# Patient Record
Sex: MALE | URBAN - METROPOLITAN AREA
[De-identification: names, ages, dates, MRNs, and addresses within clinical notes are randomized per-mention and may not be internally consistent; named-entity substitution may affect disease eponyms.]

---

## 2023-10-11 ENCOUNTER — OFFICE VISIT (OUTPATIENT)
Dept: URBAN - METROPOLITAN AREA CLINIC 72 | Facility: CLINIC | Age: 78
End: 2023-10-11
Payer: COMMERCIAL

## 2023-10-11 ENCOUNTER — DASHBOARD ENCOUNTERS (OUTPATIENT)
Age: 78
End: 2023-10-11

## 2023-10-11 VITALS
SYSTOLIC BLOOD PRESSURE: 114 MMHG | BODY MASS INDEX: 27.41 KG/M2 | WEIGHT: 202.4 LBS | TEMPERATURE: 97.3 F | HEART RATE: 64 BPM | DIASTOLIC BLOOD PRESSURE: 69 MMHG | HEIGHT: 72 IN

## 2023-10-11 DIAGNOSIS — R10.84 GENERALIZED ABDOMINAL PAIN: ICD-10-CM

## 2023-10-11 PROCEDURE — 99203 OFFICE O/P NEW LOW 30 MIN: CPT | Performed by: INTERNAL MEDICINE

## 2023-10-11 RX ORDER — ALFUZOSIN HYDROCHLORIDE 10 MG/1
TAKE ONE TABLET BY MOUTH ONE TIME DAILY AS DIRECTED TABLET, FILM COATED, EXTENDED RELEASE ORAL
Qty: 90 UNSPECIFIED | Refills: 0 | Status: ACTIVE | COMMUNITY

## 2023-10-11 RX ORDER — DUTASTERIDE 0.5 MG/1
TAKE ONE CAPSULE BY MOUTH ONE TIME DAILY CAPSULE, LIQUID FILLED ORAL
Qty: 90 UNSPECIFIED | Refills: 0 | Status: ACTIVE | COMMUNITY

## 2023-10-11 RX ORDER — METOPROLOL SUCCINATE 25 MG/1
TAKE ONE TABLET BY MOUTH AT BEDTIME TABLET, EXTENDED RELEASE ORAL
Qty: 90 UNSPECIFIED | Refills: 1 | Status: ACTIVE | COMMUNITY

## 2023-10-11 RX ORDER — PREDNISONE 5 MG/1
TABLET ORAL
Qty: 90 TABLET | Status: ACTIVE | COMMUNITY

## 2023-10-11 RX ORDER — SULFAMETHOXAZOLE AND TRIMETHOPRIM 800; 160 MG/1; MG/1
TABLET ORAL
Qty: 28 TABLET | Status: ACTIVE | COMMUNITY

## 2023-10-11 RX ORDER — ASPIRIN 81 MG/1
1 TABLET TABLET, COATED ORAL ONCE A DAY
Status: ACTIVE | COMMUNITY

## 2023-10-11 RX ORDER — ATORVASTATIN CALCIUM, FILM COATED 40 MG/1
TAKE ONE TABLET BY MOUTH ONE TIME DAILY TABLET ORAL
Qty: 90 UNSPECIFIED | Refills: 0 | Status: ACTIVE | COMMUNITY

## 2023-10-11 NOTE — HPI-TODAY'S VISIT:
Mr. Pollard is a pleasant 78-year-old male who presents as a new patient for consultation for pancreatitis.  He was referred by Dr. Alcon Hinojosa, a copy of this consultation be forwarded to the referring physician.Previous medical history includes B-cell lymphoma MGUS, hyperlipidemia, atrial fibrillation chronic left-sided thoracic pain.  Reportedly had elevated amylase and lipase.  Underwent a CT scan prior to seeing us. Lab work 9/27/2023 amylase 58, lipase 90 (normal less than 60) TSH was 1.42, WBC 3.7, hemoglobin 13.9, hematocrit 40.9, platelet count 118, creatinine 1.24, AST 42, ALT 46, alk phos 99 normal triglycerides, CRP was normal 10/6/2023 CT scan abdomen pelvis without contrast revealed normal pancreas, thickened bladder with enlarged prostate and renal cysts. PET scan 3/14/2023 done for history of large B-cell lymphoma showed no evidence of path enlarged or FDG lymphadenopathy  He reports that his abdominal pain was left-sided, it occurred at night only and was a dull aching sensation.  Occasionally it would go down his back into his hip.  Rarely that he has right-sided discomfort.  There is no relation to food.  Denies any excessive alcohol use.  Was being treated for prostatitis.  Noted that antibiotics did make his stool soft.  Fortunately his symptoms have resolved.Last colonoscopy within the last 5 years, told he needed repeat in 10 years.  Based on age no further colonoscopies recommended.

## 2023-10-11 NOTE — EXAM-GENERAL EXAMINATION
General--no acute distress, resting comfortably Eyes--anicteric, no pallor HENT--normocephalic, atraumatic head Neck--no lymphadenopathy, non tender Chest--non labored breathing, equal rise Abdomen--soft, non tender, non distended, no organomegaly Ext: LIZETH, no obvious sores or rashes Psych: appropriate mood and affect Neuro--alert and oriented, answers appropriately

## 2024-10-21 ENCOUNTER — TELEPHONE ENCOUNTER (OUTPATIENT)
Dept: URBAN - METROPOLITAN AREA CLINIC 113 | Facility: CLINIC | Age: 79
End: 2024-10-21